# Patient Record
Sex: MALE | HISPANIC OR LATINO | Employment: FULL TIME | URBAN - METROPOLITAN AREA
[De-identification: names, ages, dates, MRNs, and addresses within clinical notes are randomized per-mention and may not be internally consistent; named-entity substitution may affect disease eponyms.]

---

## 2023-03-08 PROBLEM — R03.0 BLOOD PRESSURE ELEVATED WITHOUT HISTORY OF HTN: Status: ACTIVE | Noted: 2023-03-08

## 2023-03-08 PROBLEM — J04.0 ACUTE LARYNGITIS: Status: ACTIVE | Noted: 2023-03-08

## 2023-03-08 PROBLEM — F98.8 ADD (ATTENTION DEFICIT DISORDER): Status: ACTIVE | Noted: 2023-03-08

## 2023-03-08 PROBLEM — R07.9 CHEST PAIN: Status: ACTIVE | Noted: 2023-03-08

## 2023-03-08 PROBLEM — M10.9 ACUTE GOUT: Status: ACTIVE | Noted: 2023-03-08

## 2023-03-08 PROBLEM — K59.00 CONSTIPATION: Status: ACTIVE | Noted: 2023-03-08

## 2023-03-08 PROBLEM — R19.4 CHANGE IN BOWEL HABITS: Status: ACTIVE | Noted: 2023-03-08

## 2023-03-08 PROBLEM — D12.6 COLON ADENOMA: Status: ACTIVE | Noted: 2023-03-08

## 2023-03-08 RX ORDER — SILDENAFIL 100 MG/1
100 TABLET, FILM COATED ORAL
COMMUNITY
Start: 2019-10-17

## 2023-03-08 RX ORDER — DEXTROAMPHETAMINE SACCHARATE, AMPHETAMINE ASPARTATE, DEXTROAMPHETAMINE SULFATE AND AMPHETAMINE SULFATE 5; 5; 5; 5 MG/1; MG/1; MG/1; MG/1
20 TABLET ORAL DAILY
COMMUNITY
Start: 2016-08-05 | End: 2023-03-22 | Stop reason: SDUPTHER

## 2023-03-21 ENCOUNTER — TELEPHONE (OUTPATIENT)
Dept: PRIMARY CARE | Facility: CLINIC | Age: 63
End: 2023-03-21
Payer: COMMERCIAL

## 2023-03-21 DIAGNOSIS — F98.8 ATTENTION DEFICIT DISORDER (ADD) WITHOUT HYPERACTIVITY: Primary | ICD-10-CM

## 2023-03-23 RX ORDER — DEXTROAMPHETAMINE SACCHARATE, AMPHETAMINE ASPARTATE, DEXTROAMPHETAMINE SULFATE AND AMPHETAMINE SULFATE 5; 5; 5; 5 MG/1; MG/1; MG/1; MG/1
20 TABLET ORAL DAILY
Qty: 30 TABLET | Refills: 0 | Status: SHIPPED | OUTPATIENT
Start: 2023-03-23 | End: 2023-03-28 | Stop reason: SDUPTHER

## 2023-03-27 ENCOUNTER — APPOINTMENT (OUTPATIENT)
Dept: PRIMARY CARE | Facility: CLINIC | Age: 63
End: 2023-03-27
Payer: COMMERCIAL

## 2023-03-28 ENCOUNTER — TELEMEDICINE (OUTPATIENT)
Dept: PRIMARY CARE | Facility: CLINIC | Age: 63
End: 2023-03-28
Payer: COMMERCIAL

## 2023-03-28 DIAGNOSIS — F98.8 ATTENTION DEFICIT DISORDER (ADD) WITHOUT HYPERACTIVITY: ICD-10-CM

## 2023-03-28 DIAGNOSIS — Z00.00 WELL ADULT HEALTH CHECK: Primary | ICD-10-CM

## 2023-03-28 DIAGNOSIS — F90.0 ATTENTION DEFICIT HYPERACTIVITY DISORDER (ADHD), PREDOMINANTLY INATTENTIVE TYPE: ICD-10-CM

## 2023-03-28 PROCEDURE — 99213 OFFICE O/P EST LOW 20 MIN: CPT | Performed by: INTERNAL MEDICINE

## 2023-03-28 RX ORDER — DEXTROAMPHETAMINE SACCHARATE, AMPHETAMINE ASPARTATE, DEXTROAMPHETAMINE SULFATE AND AMPHETAMINE SULFATE 5; 5; 5; 5 MG/1; MG/1; MG/1; MG/1
20 TABLET ORAL DAILY
Qty: 30 TABLET | Refills: 0 | Status: SHIPPED | OUTPATIENT
Start: 2023-03-28 | End: 2023-04-21 | Stop reason: SDUPTHER

## 2023-03-28 ASSESSMENT — ENCOUNTER SYMPTOMS: HEADACHES: 1

## 2023-03-28 NOTE — PROGRESS NOTES
Subjective   Patient ID: Rajat Donovan is a 62 y.o. male who presents for Headache.  Patient in for a visit, on medication for attention deficit disorder. Patient denies having any side effects, no problems with appetite, sleep, and it is effective.      Headache         Review of Systems   Neurological:  Positive for headaches.       Objective   Physical Exam  There were no vitals taken for this visit.        Assessment/Plan   Problem List Items Addressed This Visit          Other    ADD (attention deficit disorder)    Well adult health check - Primary    Relevant Orders    Colonoscopy     ATTENTION DEFICIT PLAN: Effective medication with no side effects, blood pressure is normal , sleep, appetite, and weight stable. Medication will be continued, advised to keep prescription in a safe place and used only for the prescribed medical condition. If problems arise patient knows to discontinue Medication and come back sooner.  Urine Drug Screen up to date, Controlled Substance Agreement signed and up to date.  OARRS report reviewed.

## 2023-04-21 DIAGNOSIS — F98.8 ATTENTION DEFICIT DISORDER (ADD) WITHOUT HYPERACTIVITY: ICD-10-CM

## 2023-04-21 RX ORDER — DEXTROAMPHETAMINE SACCHARATE, AMPHETAMINE ASPARTATE, DEXTROAMPHETAMINE SULFATE AND AMPHETAMINE SULFATE 5; 5; 5; 5 MG/1; MG/1; MG/1; MG/1
20 TABLET ORAL DAILY
Qty: 30 TABLET | Refills: 0 | Status: SHIPPED | OUTPATIENT
Start: 2023-04-21 | End: 2023-05-31 | Stop reason: SDUPTHER

## 2023-05-22 DIAGNOSIS — M10.9 GOUT, UNSPECIFIED CAUSE, UNSPECIFIED CHRONICITY, UNSPECIFIED SITE: Primary | ICD-10-CM

## 2023-05-22 RX ORDER — COLCHICINE 0.6 MG/1
0.6 TABLET ORAL 2 TIMES DAILY
Qty: 30 TABLET | Refills: 1 | Status: SHIPPED | OUTPATIENT
Start: 2023-05-22 | End: 2023-06-06

## 2023-05-22 RX ORDER — COLCHICINE 0.6 MG/1
0.6 TABLET ORAL 2 TIMES DAILY
COMMUNITY
End: 2023-05-22 | Stop reason: SDUPTHER

## 2023-05-31 DIAGNOSIS — F98.8 ATTENTION DEFICIT DISORDER (ADD) WITHOUT HYPERACTIVITY: ICD-10-CM

## 2023-05-31 NOTE — TELEPHONE ENCOUNTER
Patient has not had in office appointment for a while he is requesting a  refill on his Adderall? Please advise

## 2023-06-01 RX ORDER — DEXTROAMPHETAMINE SACCHARATE, AMPHETAMINE ASPARTATE, DEXTROAMPHETAMINE SULFATE AND AMPHETAMINE SULFATE 5; 5; 5; 5 MG/1; MG/1; MG/1; MG/1
20 TABLET ORAL DAILY
Qty: 30 TABLET | Refills: 0 | Status: SHIPPED | OUTPATIENT
Start: 2023-06-01 | End: 2023-08-21 | Stop reason: SDUPTHER

## 2023-06-04 DIAGNOSIS — M10.9 GOUT, UNSPECIFIED CAUSE, UNSPECIFIED CHRONICITY, UNSPECIFIED SITE: ICD-10-CM

## 2023-06-06 RX ORDER — COLCHICINE 0.6 MG/1
0.6 TABLET ORAL 2 TIMES DAILY
Qty: 180 TABLET | Refills: 1 | Status: SHIPPED | OUTPATIENT
Start: 2023-06-06

## 2023-08-17 ENCOUNTER — TELEPHONE (OUTPATIENT)
Dept: PRIMARY CARE | Facility: CLINIC | Age: 63
End: 2023-08-17
Payer: COMMERCIAL

## 2023-08-18 NOTE — TELEPHONE ENCOUNTER
LVM for patient to CB and schedule an appt. In order to get refill sent it has been over 6 months. UDS isnt due until 12-

## 2023-08-21 ENCOUNTER — OFFICE VISIT (OUTPATIENT)
Dept: PRIMARY CARE | Facility: CLINIC | Age: 63
End: 2023-08-21
Payer: COMMERCIAL

## 2023-08-21 VITALS
HEIGHT: 73 IN | RESPIRATION RATE: 17 BRPM | WEIGHT: 214.6 LBS | OXYGEN SATURATION: 96 % | HEART RATE: 82 BPM | BODY MASS INDEX: 28.44 KG/M2 | SYSTOLIC BLOOD PRESSURE: 122 MMHG | DIASTOLIC BLOOD PRESSURE: 74 MMHG

## 2023-08-21 DIAGNOSIS — F98.8 ATTENTION DEFICIT DISORDER (ADD) WITHOUT HYPERACTIVITY: ICD-10-CM

## 2023-08-21 PROCEDURE — 99213 OFFICE O/P EST LOW 20 MIN: CPT | Performed by: INTERNAL MEDICINE

## 2023-08-21 PROCEDURE — 1036F TOBACCO NON-USER: CPT | Performed by: INTERNAL MEDICINE

## 2023-08-21 RX ORDER — DEXTROAMPHETAMINE SACCHARATE, AMPHETAMINE ASPARTATE, DEXTROAMPHETAMINE SULFATE AND AMPHETAMINE SULFATE 5; 5; 5; 5 MG/1; MG/1; MG/1; MG/1
20 TABLET ORAL DAILY
Qty: 30 TABLET | Refills: 0 | Status: SHIPPED | OUTPATIENT
Start: 2023-08-21 | End: 2023-10-03 | Stop reason: SDUPTHER

## 2023-08-21 ASSESSMENT — PAIN SCALES - GENERAL: PAINLEVEL: 0-NO PAIN

## 2023-08-21 NOTE — PROGRESS NOTES
"Subjective   Patient ID: Rajat Donovan is a 62 y.o. male who presents for Med Refill.  In for ADD refill. Doing well and health maint Up to date.      .  Review of Systems   All other systems reviewed and are negative.      Objective   Physical Exam  Constitutional:       Appearance: Normal appearance.   HENT:      Head: Normocephalic and atraumatic.      Nose: Nose normal.   Cardiovascular:      Rate and Rhythm: Normal rate and regular rhythm.   Abdominal:      General: Abdomen is flat.      Palpations: Abdomen is soft.   Musculoskeletal:         General: Normal range of motion.      Cervical back: Normal range of motion.   Skin:     General: Skin is warm and dry.   Neurological:      Mental Status: He is alert.       /74 (BP Location: Left arm)   Pulse 82   Resp 17   Ht 1.854 m (6' 1\")   Wt 97.3 kg (214 lb 9.6 oz)   SpO2 96%   BMI 28.31 kg/m²         Assessment/Plan   Problem List Items Addressed This Visit       ADD (attention deficit disorder)     .jpadd     "

## 2023-08-21 NOTE — PROGRESS NOTES
Patient is here for a 6 month f/up for controlled medication   UDS Due - 12/13/2023  No pain   Needs 1 Rx refill

## 2023-08-22 ENCOUNTER — APPOINTMENT (OUTPATIENT)
Dept: PRIMARY CARE | Facility: CLINIC | Age: 63
End: 2023-08-22
Payer: COMMERCIAL

## 2023-10-03 DIAGNOSIS — F98.8 ATTENTION DEFICIT DISORDER (ADD) WITHOUT HYPERACTIVITY: ICD-10-CM

## 2023-10-05 RX ORDER — DEXTROAMPHETAMINE SACCHARATE, AMPHETAMINE ASPARTATE, DEXTROAMPHETAMINE SULFATE AND AMPHETAMINE SULFATE 5; 5; 5; 5 MG/1; MG/1; MG/1; MG/1
20 TABLET ORAL DAILY
Qty: 30 TABLET | Refills: 0 | Status: SHIPPED | OUTPATIENT
Start: 2023-10-05 | End: 2024-03-18 | Stop reason: SDUPTHER

## 2023-10-13 ENCOUNTER — OFFICE VISIT (OUTPATIENT)
Dept: GASTROENTEROLOGY | Facility: EXTERNAL LOCATION | Age: 63
End: 2023-10-13
Payer: COMMERCIAL

## 2023-10-13 DIAGNOSIS — K63.89 OTHER SPECIFIED DISEASES OF INTESTINE: ICD-10-CM

## 2023-10-13 DIAGNOSIS — Z12.11 SCREEN FOR COLON CANCER: Primary | ICD-10-CM

## 2023-10-13 DIAGNOSIS — Z86.010 PERSONAL HISTORY OF COLONIC POLYPS: ICD-10-CM

## 2023-10-13 DIAGNOSIS — K57.30 DIVERTICULOSIS OF LARGE INTESTINE WITHOUT DIVERTICULITIS: ICD-10-CM

## 2023-10-13 DIAGNOSIS — K64.8 INTERNAL HEMORRHOIDS: ICD-10-CM

## 2023-10-13 PROCEDURE — 45378 DIAGNOSTIC COLONOSCOPY: CPT | Performed by: INTERNAL MEDICINE

## 2023-10-13 PROCEDURE — 1036F TOBACCO NON-USER: CPT | Performed by: INTERNAL MEDICINE

## 2024-03-18 DIAGNOSIS — F98.8 ATTENTION DEFICIT DISORDER (ADD) WITHOUT HYPERACTIVITY: ICD-10-CM

## 2024-03-18 RX ORDER — DEXTROAMPHETAMINE SACCHARATE, AMPHETAMINE ASPARTATE, DEXTROAMPHETAMINE SULFATE AND AMPHETAMINE SULFATE 5; 5; 5; 5 MG/1; MG/1; MG/1; MG/1
20 TABLET ORAL DAILY
Qty: 30 TABLET | Refills: 0 | Status: SHIPPED | OUTPATIENT
Start: 2024-03-18 | End: 2024-03-20 | Stop reason: SDUPTHER

## 2024-03-20 DIAGNOSIS — F98.8 ATTENTION DEFICIT DISORDER (ADD) WITHOUT HYPERACTIVITY: ICD-10-CM

## 2024-03-20 RX ORDER — DEXTROAMPHETAMINE SACCHARATE, AMPHETAMINE ASPARTATE, DEXTROAMPHETAMINE SULFATE AND AMPHETAMINE SULFATE 5; 5; 5; 5 MG/1; MG/1; MG/1; MG/1
20 TABLET ORAL DAILY
Qty: 30 TABLET | Refills: 0 | Status: SHIPPED | OUTPATIENT
Start: 2024-03-20

## 2024-03-20 NOTE — TELEPHONE ENCOUNTER
We received a fax from [facility] stating they no longer accepts the patients insurance, I contacted the patient and updated his preferred pharmacy for Rx refill

## 2024-07-18 DIAGNOSIS — Z00.00 LABORATORY EXAMINATION ORDERED AS PART OF A COMPLETE PHYSICAL EXAMINATION: ICD-10-CM

## 2024-07-29 ENCOUNTER — APPOINTMENT (OUTPATIENT)
Dept: PRIMARY CARE | Facility: CLINIC | Age: 64
End: 2024-07-29
Payer: COMMERCIAL

## 2024-07-31 ENCOUNTER — LAB (OUTPATIENT)
Dept: LAB | Facility: LAB | Age: 64
End: 2024-07-31
Payer: COMMERCIAL

## 2024-07-31 DIAGNOSIS — Z00.00 LABORATORY EXAMINATION ORDERED AS PART OF A COMPLETE PHYSICAL EXAMINATION: ICD-10-CM

## 2024-07-31 LAB
25(OH)D3 SERPL-MCNC: 44 NG/ML (ref 30–100)
ALT SERPL W P-5'-P-CCNC: 30 U/L (ref 10–52)
ANION GAP SERPL CALC-SCNC: 15 MMOL/L (ref 10–20)
APPEARANCE UR: CLEAR
BILIRUB UR STRIP.AUTO-MCNC: NEGATIVE MG/DL
BUN SERPL-MCNC: 22 MG/DL (ref 6–23)
CALCIUM SERPL-MCNC: 9.9 MG/DL (ref 8.6–10.6)
CHLORIDE SERPL-SCNC: 102 MMOL/L (ref 98–107)
CHOLEST SERPL-MCNC: 242 MG/DL (ref 0–199)
CHOLESTEROL/HDL RATIO: 5.1
CO2 SERPL-SCNC: 28 MMOL/L (ref 21–32)
COLOR UR: YELLOW
CREAT SERPL-MCNC: 0.98 MG/DL (ref 0.5–1.3)
EGFRCR SERPLBLD CKD-EPI 2021: 87 ML/MIN/1.73M*2
ERYTHROCYTE [DISTWIDTH] IN BLOOD BY AUTOMATED COUNT: 13 % (ref 11.5–14.5)
EST. AVERAGE GLUCOSE BLD GHB EST-MCNC: 114 MG/DL
GLUCOSE SERPL-MCNC: 91 MG/DL (ref 74–99)
GLUCOSE UR STRIP.AUTO-MCNC: NORMAL MG/DL
HBA1C MFR BLD: 5.6 %
HCT VFR BLD AUTO: 49.1 % (ref 41–52)
HDLC SERPL-MCNC: 47.3 MG/DL
HGB BLD-MCNC: 15.6 G/DL (ref 13.5–17.5)
KETONES UR STRIP.AUTO-MCNC: NEGATIVE MG/DL
LDLC SERPL CALC-MCNC: 129 MG/DL
LEUKOCYTE ESTERASE UR QL STRIP.AUTO: NEGATIVE
MCH RBC QN AUTO: 28 PG (ref 26–34)
MCHC RBC AUTO-ENTMCNC: 31.8 G/DL (ref 32–36)
MCV RBC AUTO: 88 FL (ref 80–100)
NITRITE UR QL STRIP.AUTO: NEGATIVE
NON HDL CHOLESTEROL: 195 MG/DL (ref 0–149)
NRBC BLD-RTO: 0 /100 WBCS (ref 0–0)
PH UR STRIP.AUTO: 5.5 [PH]
PLATELET # BLD AUTO: 268 X10*3/UL (ref 150–450)
POTASSIUM SERPL-SCNC: 4.8 MMOL/L (ref 3.5–5.3)
PROT UR STRIP.AUTO-MCNC: NEGATIVE MG/DL
PSA SERPL-MCNC: 5.02 NG/ML
RBC # BLD AUTO: 5.58 X10*6/UL (ref 4.5–5.9)
RBC # UR STRIP.AUTO: NEGATIVE /UL
SODIUM SERPL-SCNC: 140 MMOL/L (ref 136–145)
SP GR UR STRIP.AUTO: 1.03
TRIGL SERPL-MCNC: 328 MG/DL (ref 0–149)
TSH SERPL-ACNC: 2.56 MIU/L (ref 0.44–3.98)
UROBILINOGEN UR STRIP.AUTO-MCNC: NORMAL MG/DL
VIT B12 SERPL-MCNC: 525 PG/ML (ref 211–911)
VLDL: 66 MG/DL (ref 0–40)
WBC # BLD AUTO: 7.5 X10*3/UL (ref 4.4–11.3)

## 2024-07-31 PROCEDURE — 82306 VITAMIN D 25 HYDROXY: CPT

## 2024-07-31 PROCEDURE — 84153 ASSAY OF PSA TOTAL: CPT

## 2024-07-31 PROCEDURE — 84460 ALANINE AMINO (ALT) (SGPT): CPT

## 2024-07-31 PROCEDURE — 81003 URINALYSIS AUTO W/O SCOPE: CPT

## 2024-07-31 PROCEDURE — 83036 HEMOGLOBIN GLYCOSYLATED A1C: CPT

## 2024-07-31 PROCEDURE — 85027 COMPLETE CBC AUTOMATED: CPT

## 2024-07-31 PROCEDURE — 80048 BASIC METABOLIC PNL TOTAL CA: CPT

## 2024-07-31 PROCEDURE — 80061 LIPID PANEL: CPT

## 2024-07-31 PROCEDURE — 84443 ASSAY THYROID STIM HORMONE: CPT

## 2024-07-31 PROCEDURE — 36415 COLL VENOUS BLD VENIPUNCTURE: CPT

## 2024-07-31 PROCEDURE — 82607 VITAMIN B-12: CPT

## 2024-08-05 ENCOUNTER — APPOINTMENT (OUTPATIENT)
Dept: PRIMARY CARE | Facility: CLINIC | Age: 64
End: 2024-08-05
Payer: COMMERCIAL

## 2024-08-09 ENCOUNTER — APPOINTMENT (OUTPATIENT)
Dept: PRIMARY CARE | Facility: CLINIC | Age: 64
End: 2024-08-09
Payer: COMMERCIAL

## 2024-08-09 VITALS
WEIGHT: 218 LBS | OXYGEN SATURATION: 98 % | TEMPERATURE: 97.8 F | HEART RATE: 92 BPM | RESPIRATION RATE: 18 BRPM | DIASTOLIC BLOOD PRESSURE: 100 MMHG | BODY MASS INDEX: 28.89 KG/M2 | HEIGHT: 73 IN | SYSTOLIC BLOOD PRESSURE: 170 MMHG

## 2024-08-09 DIAGNOSIS — F98.8 ATTENTION DEFICIT DISORDER (ADD) WITHOUT HYPERACTIVITY: ICD-10-CM

## 2024-08-09 DIAGNOSIS — E78.00 ELEVATED LDL CHOLESTEROL LEVEL: Primary | ICD-10-CM

## 2024-08-09 LAB
AMPHETAMINES UR QL SCN: NORMAL
BARBITURATES UR QL SCN: NORMAL
BZE UR QL SCN: NORMAL
CANNABINOIDS UR QL SCN: NORMAL
CREAT UR-MCNC: 47.3 MG/DL (ref 20–370)
PCP UR QL SCN: NORMAL

## 2024-08-09 PROCEDURE — 80358 DRUG SCREENING METHADONE: CPT

## 2024-08-09 PROCEDURE — 80346 BENZODIAZEPINES1-12: CPT

## 2024-08-09 PROCEDURE — 80361 OPIATES 1 OR MORE: CPT

## 2024-08-09 PROCEDURE — 82570 ASSAY OF URINE CREATININE: CPT

## 2024-08-09 PROCEDURE — 80307 DRUG TEST PRSMV CHEM ANLYZR: CPT

## 2024-08-09 PROCEDURE — 80373 DRUG SCREENING TRAMADOL: CPT

## 2024-08-09 PROCEDURE — 99396 PREV VISIT EST AGE 40-64: CPT | Performed by: INTERNAL MEDICINE

## 2024-08-09 PROCEDURE — 1036F TOBACCO NON-USER: CPT | Performed by: INTERNAL MEDICINE

## 2024-08-09 PROCEDURE — 80354 DRUG SCREENING FENTANYL: CPT

## 2024-08-09 PROCEDURE — 3008F BODY MASS INDEX DOCD: CPT | Performed by: INTERNAL MEDICINE

## 2024-08-09 PROCEDURE — 80365 DRUG SCREENING OXYCODONE: CPT

## 2024-08-09 PROCEDURE — 80368 SEDATIVE HYPNOTICS: CPT

## 2024-08-09 RX ORDER — DEXTROAMPHETAMINE SACCHARATE, AMPHETAMINE ASPARTATE, DEXTROAMPHETAMINE SULFATE AND AMPHETAMINE SULFATE 5; 5; 5; 5 MG/1; MG/1; MG/1; MG/1
20 TABLET ORAL DAILY
Qty: 30 TABLET | Refills: 0 | Status: SHIPPED | OUTPATIENT
Start: 2024-08-09

## 2024-08-09 NOTE — PROGRESS NOTES
"Subjective   Patient ID: Rajat Donovan is a 63 y.o. male who presents for Annual Exam (Hard stool,full body scan).  Patient comes in for a physical examination, doing well over - all with no particular complaints.   Also in for laboratory review and health maintenance update.  Updating family history as well.          Review of Systems   All other systems reviewed and are negative.      Objective   Physical Exam  Constitutional:       Appearance: Normal appearance.   HENT:      Head: Normocephalic and atraumatic.      Nose: Nose normal.   Cardiovascular:      Rate and Rhythm: Normal rate and regular rhythm.   Abdominal:      General: Abdomen is flat.      Palpations: Abdomen is soft.   Musculoskeletal:         General: Normal range of motion.      Cervical back: Normal range of motion.   Skin:     General: Skin is warm and dry.   Neurological:      Mental Status: He is alert.       BP (!) 170/100   Pulse 92   Temp 36.6 °C (97.8 °F)   Resp 18   Ht 1.854 m (6' 1\")   Wt 98.9 kg (218 lb)   SpO2 98%   BMI 28.76 kg/m²         Assessment/Plan   Problem List Items Addressed This Visit       ADD (attention deficit disorder)    Relevant Medications    amphetamine-dextroamphetamine (Adderall) 20 mg tablet    Other Relevant Orders    Opiate/Opioid/Benzo Prescription Compliance    Elevated LDL cholesterol level - Primary    Relevant Orders    MR prostate with idalia boundaries if pirads 3 or above     ASSESSMENT AND PLAN: Patient on examination is in fair health except for medical conditions discussed above, obtained screening blood tests to screen for high cholesterol, diabetes, thyroid, Ekg if above 50 years old or earlier if with cardiac history. Patient should be taking enough calcium in a balanced diet  Weight control especially if BMI is above ideal range. For Female Patients Only:  Mammogram yearly and PAP test with gynecology unless s/p Total hysterectomy  Bone density test at age 60 and above and every two years " after that. Preventive Medicine: colon cancer screening by age 45 if no family history as well PSA @ 50 , balanced diet, and exercise as discussed. Seat belt use for injury prevention, living will. Substance use and /or tobacco use counseled when applicable. Alcohol use discussed, use designated . Immunizations TD age 50 and every 10 years. Pneumovax and shingles vaccine counseled. Yearly flu vaccine unless contraindicated. More than 50% of office visit time spent counseling the patient, questions were answered. If problems arise, patient is to call or come back in. It is understood that the responsibility of healthcare is shared by the patient by following a healthy lifestyle and following the plan above as discussed. Advised complete physical examination every year. Pending additional results, may need to come for a return office visit for follow-up.

## 2024-09-04 ENCOUNTER — HOSPITAL ENCOUNTER (OUTPATIENT)
Dept: RADIOLOGY | Facility: HOSPITAL | Age: 64
Discharge: HOME | End: 2024-09-04
Payer: COMMERCIAL

## 2024-09-04 VITALS — WEIGHT: 220 LBS | BODY MASS INDEX: 29.03 KG/M2

## 2024-09-04 DIAGNOSIS — E78.00 ELEVATED LDL CHOLESTEROL LEVEL: ICD-10-CM

## 2024-09-04 PROCEDURE — 72197 MRI PELVIS W/O & W/DYE: CPT

## 2024-09-04 PROCEDURE — A9575 INJ GADOTERATE MEGLUMI 0.1ML: HCPCS | Performed by: INTERNAL MEDICINE

## 2024-09-04 PROCEDURE — 76498 UNLISTED MR PROCEDURE: CPT | Performed by: RADIOLOGY

## 2024-09-04 PROCEDURE — 72197 MRI PELVIS W/O & W/DYE: CPT | Performed by: RADIOLOGY

## 2024-09-04 PROCEDURE — 2550000001 HC RX 255 CONTRASTS: Performed by: INTERNAL MEDICINE

## 2024-09-04 RX ORDER — GADOTERATE MEGLUMINE 376.9 MG/ML
19 INJECTION INTRAVENOUS
Status: COMPLETED | OUTPATIENT
Start: 2024-09-04 | End: 2024-09-04

## 2024-09-09 ENCOUNTER — TELEPHONE (OUTPATIENT)
Dept: PRIMARY CARE | Facility: CLINIC | Age: 64
End: 2024-09-09
Payer: COMMERCIAL

## 2024-09-09 DIAGNOSIS — N40.2 PROSTATE NODULE: ICD-10-CM

## 2024-09-09 DIAGNOSIS — R97.20 ELEVATED PSA: ICD-10-CM

## 2024-09-09 NOTE — TELEPHONE ENCOUNTER
Pt said JOSE MARIA called him from [facility] yesterday and wants him to have help getting sched w Urologist.  Pt does not remember the name of the urologist.  Tresa GROSS.

## 2024-09-12 ENCOUNTER — OFFICE VISIT (OUTPATIENT)
Dept: UROLOGY | Facility: CLINIC | Age: 64
End: 2024-09-12
Payer: COMMERCIAL

## 2024-09-12 DIAGNOSIS — R97.20 ELEVATED PSA: Primary | ICD-10-CM

## 2024-09-12 LAB
POC APPEARANCE, URINE: CLEAR
POC BILIRUBIN, URINE: NEGATIVE
POC BLOOD, URINE: NEGATIVE
POC COLOR, URINE: YELLOW
POC GLUCOSE, URINE: NEGATIVE MG/DL
POC KETONES, URINE: NEGATIVE MG/DL
POC LEUKOCYTES, URINE: NEGATIVE
POC NITRITE,URINE: NEGATIVE
POC PH, URINE: 7.5 PH
POC PROTEIN, URINE: NEGATIVE MG/DL
POC SPECIFIC GRAVITY, URINE: 1.02
POC UROBILINOGEN, URINE: 0.2 EU/DL

## 2024-09-12 PROCEDURE — 99203 OFFICE O/P NEW LOW 30 MIN: CPT | Performed by: NURSE PRACTITIONER

## 2024-09-12 PROCEDURE — 81003 URINALYSIS AUTO W/O SCOPE: CPT | Performed by: NURSE PRACTITIONER

## 2024-09-12 NOTE — PROGRESS NOTES
[facility]  Outpatient Clinic Note    Subjective   Rajat Donovan is a 63 y.o. male    History of Present Illness   Patient presenting to clinic today kindly referred by PCP Dr. Sims. NPV for evaluation of elevated PSA. History of HDL, Constipation, ADD, and ED taking Sildenafil 100 mg.   Family history Prostate Cancer: Uncle, . Unsure if he passed due to Prostate Cancer.   Patient denies bothersome urinary sympotms. No gross hematuria, dysuria, frequency, fevers, n/v, or flank pain.   NTF 0 to 1x depending on fluid intake. Drinks at least a gallon of water daily. Drinks green tea, and mostly   Plant based diet. Patient is a nonsmoker     Urinalysis today is Negative   PVR: Patient Declines     I personally reviewed Prostate MRI from 2024   IMPRESSION:  A PI-RADS 4 lesion in the right posteromedial peripheral zone of the  midgland. No definite signs of gross extracapsular extension. No  evidence of enlarged pelvic lymph nodes.    Lab Results   Component Value Date    PSA 5.02 (H) 2024    PSA 3.45 2022     Past Medical History and Surgical History   No past medical history on file.  No past surgical history on file.    Medications  Current Outpatient Medications on File Prior to Visit   Medication Sig Dispense Refill    amphetamine-dextroamphetamine (Adderall) 20 mg tablet Take 1 tablet (20 mg) by mouth once daily. 30 tablet 0    colchicine 0.6 mg tablet TAKE 1 TABLET (0.6 MG) BY MOUTH 2 TIMES A DAY. 180 tablet 1    sildenafil (Viagra) 100 mg tablet Take 1 tablet (100 mg) by mouth. Take 6 tablet other       No current facility-administered medications on file prior to visit.       Objective   Physicial Exam  General: Well developed, well nourished, alert and cooperative, appears in no acute distress  Eyes: Non-injected conjunctiva, sclera clear, no proptosis  Cardiac: Extremities are warm and well perfused. No edema, cyanosis or pallor.   Lungs: Breathing is easy, non-labored.  Speaking in clear and complete sentences. Normal diaphragmatic movement.  MSK: Ambulatory with steady gait, unassisted  Neuro: alert and oriented to person, place and time  Psych: Demonstrates good judgement and reason, without hallucinations, abnormal affect or abnormal behaviors.  Skin: no obvious lesions, no rashes.    No visits with results within 1 Day(s) from this visit.   Latest known visit with results is:   Office Visit on 08/09/2024   Component Date Value Ref Range Status    Creatinine, Urine Random 08/09/2024 47.3  20.0 - 370.0 mg/dL Final    A urine creatinine result >= 20 mg/dL is considered valid without suspicion of dilution.  Samples with results below this range will automatically reflex to specific  gravity testing to verify specimen integrity.    Amphetamine Screen, Urine 08/09/2024 Presumptive Negative  Presumptive Negative Final    CUTOFF LEVEL: 500 NG/ML   Cross-reactivity has been reported with high concentrations   of the following drugs: buproprion, chloroquine, chlorpromazine,   ephedrine, mephentermine, fenfluramine, phentermine,   phenylpropanolamine, pseudoephedrine, and propranolol.    Barbiturate Screen, Urine 08/09/2024 Presumptive Negative  Presumptive Negative Final    CUTOFF LEVEL: 200 NG/ML    Cannabinoid Screen, Urine 08/09/2024 Presumptive Negative  Presumptive Negative Final    CUTOFF LEVEL: 50 NG/ML    Cocaine Metabolite Screen, Urine 08/09/2024 Presumptive Negative  Presumptive Negative Final    CUTOFF LEVEL: 150 NG/ML    PCP Screen, Urine 08/09/2024 Presumptive Negative  Presumptive Negative Final    CUTOFF LEVEL:  25 NG/ML  Cross-reactivity has been reported with dextromethorphan.    Clonazepam 08/09/2024 <25  <25 ng/mL Final    7-Aminoclonazepam 08/09/2024 <25  <25 ng/mL Final    Alprazolam 08/09/2024 <25  <25 ng/mL Final    Alpha-Hydroxyalprazolam 08/09/2024 <25  <25 ng/mL Final    Midazolam 08/09/2024 <25  <25 ng/mL Final    Alpha-Hydroxymidazolam 08/09/2024 <25  <25  ng/mL Final    Chlordiazepoxide 08/09/2024 <25  <25 ng/mL Final    Diazepam 08/09/2024 <25  <25 ng/mL Final    Nordiazepam 08/09/2024 <25  <25 ng/mL Final    Temazepam 08/09/2024 <25  <25 ng/mL Final    Oxazepam 08/09/2024 <25  <25 ng/mL Final    Lorazepam 08/09/2024 <25  <25 ng/mL Final    Methadone 08/09/2024 <25  <25 ng/mL Final    EDDP 08/09/2024 <25  <25 ng/mL Final    6-Acetylmorphine 08/09/2024 <25  <25 ng/mL Final    Codeine 08/09/2024 <50  <50 ng/mL Final    Hydrocodone 08/09/2024 <25  <25 ng/mL Final    Hydromorphone 08/09/2024 <25  <25 ng/mL Final    Morphine  08/09/2024 <50  <50 ng/mL Final    Norhydrocodone 08/09/2024 <25  <25 ng/mL Final    Noroxycodone 08/09/2024 <25  <25 ng/mL Final    Oxycodone 08/09/2024 <25  <25 ng/mL Final    Oxymorphone 08/09/2024 <25  <25 ng/mL Final    Fentanyl 08/09/2024 <2.5  <2.5 ng/mL Final    Norfentanyl 08/09/2024 <2.5  <2.5 ng/mL Final    Tramadol 08/09/2024 <50  <50 ng/mL Final    O-Desmethyltramadol 08/09/2024 <50  <50 ng/mL Final    Zolpidem 08/09/2024 <25  <25 ng/mL Final    Zolpidem Metabolite (ZCA) 08/09/2024 <25  <25 ng/mL Final      Review of Systems  All other systems have been reviewed and are negative for complaint.      Assessment and Plan     - Elevated PSA   Today we discussed PSA as a tool to screen gentleman for prostate cancer. We discussed that many factors can elevate the PSA, and when the PSA is elevated further testing is warranted. I explained that while PSA is used for  prostate cancer screening, it is not specific to prostate cancer and can be elevated with benign growth of the prostate. We discussed options of proceeding with transrectal ultrasound prostate biopsy, or continuing to monitor his PSA.     Patient considering Prostate Biopsy. Has virtual appt. With Dr. Yao tomorrow    All questions and concerns were addressed. Patient verbalizes understanding and has no other questions at this time. You are able to have email access to your  chart. You can sign into MyCare or add the Follow My Health anila on your smart phone to review today's visit, laboratory work and imaging.   If you have any questions about your care, do not hesitate to call and leave a message, we return calls in a timely manner.    Rose Marie-- NATASHA LANGE  Office Phone:  [phone]

## 2024-09-12 NOTE — PROGRESS NOTES
"Virtual or Telephone Consent    An interactive audio and video telecommunication system which permits real time communications between the patient (at the originating site) and provider (at the distant site) was utilized to provide this telehealth service.   Verbal consent was requested and obtained from Rajat Donovan on this date, 24 for a telehealth visit.     FUV    Last visit - with Feli     HISTORY OF PRESENT ILLNESS:   Rajat Donovan is a 63 y.o. male who is being seen today for elevated PSA with PI-RADS 4 on MRI  -History of HDL, Constipation, ADD  -ED taking Sildenafil 100 mg.   -Family history Prostate Cancer: Uncle, . Unsure if he passed due to Prostate Cancer.   PAST MEDICAL HISTORY:  No past medical history on file.    PAST SURGICAL HISTORY:  No past surgical history on file.     ALLERGIES:   No Known Allergies     MEDICATIONS:   Current Outpatient Medications   Medication Instructions    amphetamine-dextroamphetamine (Adderall) 20 mg tablet 20 mg, oral, Daily    colchicine 0.6 mg, oral, 2 times daily    sildenafil (VIAGRA) 100 mg, oral, Take 6 tablet other        PHYSICAL EXAM:  There were no vitals taken for this visit.  Constitutional: Patient appears well-developed and well-nourished. No distress.    Pulmonary/Chest: Effort normal. No respiratory distress.   Abdominal: Soft, ND NT  : WNL  Musculoskeletal: Normal range of motion.    Neurological: Alert and oriented to person, place, and time.  Psychiatric: Normal mood and affect. Behavior is normal. Thought content normal.      Labs:  No results found for: \"TESTOSTERONE\"  Lab Results   Component Value Date    PSA 5.02 (H) 2024     PSA 3.45 22          No components found for: \"CBC\"  Lab Results   Component Value Date    CREATININE 0.98 2024     No components found for: \"TESTOTMS\"  No results found for: \"TESTF\"    Imaging: Prostate MRI from 2024   IMPRESSION:  A PI-RADS 4 lesion in the right posteromedial " peripheral zone of the midgland. No definite signs of gross extracapsular extension. No evidence of enlarged pelvic lymph nodes.    Discussion: Results reviewed with patient. Patient reassured.   No urinary complaints at this time. Denies hematuria, dysuria, nocturia, flank pain, and bothersome frequency or urgency. Denies pushing or straining to void and states he feels he empties his bladder when voiding.Patient mentions he drinks a gallon of water daily. Denies infections, no prior kidney or prostate disease. With his PSA and MRI findings,recommend proceeding with a prostate biopsy, explained. Risks, benefits, alternatives discussed. Patient's concerns addressed. Patient agrees to proceed with a biopsy and will be scheduled in a timely manner. Patient would like to be scheduled after November 16th because his daughter is getting . Denies bloodthinners. Will proceed with biopsy in-office.        Assessment:      1. Elevated PSA  Biopsy prostate             Plan:   Schedule prostate biopsy and a follow up 2 weeks after biopsy to discuss results.   All questions and concerns were addressed. Patient verbalizes understanding and has no other questions at this time.     Scribe Attestation  By signing my name below, IRadhika Scribe   attest that this documentation has been prepared under the direction and in the presence of Juan Yao MD.

## 2024-09-13 ENCOUNTER — TELEMEDICINE (OUTPATIENT)
Dept: UROLOGY | Facility: HOSPITAL | Age: 64
End: 2024-09-13
Payer: COMMERCIAL

## 2024-09-13 DIAGNOSIS — R97.20 ELEVATED PSA: ICD-10-CM

## 2024-09-13 PROCEDURE — G2211 COMPLEX E/M VISIT ADD ON: HCPCS | Performed by: UROLOGY

## 2024-09-13 PROCEDURE — 99214 OFFICE O/P EST MOD 30 MIN: CPT | Performed by: UROLOGY

## 2024-10-01 DIAGNOSIS — F98.8 ATTENTION DEFICIT DISORDER (ADD) WITHOUT HYPERACTIVITY: ICD-10-CM

## 2024-10-02 RX ORDER — DEXTROAMPHETAMINE SACCHARATE, AMPHETAMINE ASPARTATE, DEXTROAMPHETAMINE SULFATE AND AMPHETAMINE SULFATE 5; 5; 5; 5 MG/1; MG/1; MG/1; MG/1
20 TABLET ORAL DAILY
Qty: 30 TABLET | Refills: 0 | Status: SHIPPED | OUTPATIENT
Start: 2024-10-02

## 2024-11-06 DIAGNOSIS — F98.8 ATTENTION DEFICIT DISORDER (ADD) WITHOUT HYPERACTIVITY: ICD-10-CM

## 2024-11-06 RX ORDER — DEXTROAMPHETAMINE SACCHARATE, AMPHETAMINE ASPARTATE, DEXTROAMPHETAMINE SULFATE AND AMPHETAMINE SULFATE 5; 5; 5; 5 MG/1; MG/1; MG/1; MG/1
20 TABLET ORAL DAILY
Qty: 30 TABLET | Refills: 0 | Status: SHIPPED | OUTPATIENT
Start: 2024-11-06

## 2024-11-11 NOTE — TELEPHONE ENCOUNTER
Adderall sent to wrong pharm.   Pt req [facility]:   [id number] - [facility] [place], [place] - [address] [place] [address] RD

## 2024-11-13 DIAGNOSIS — F98.8 ATTENTION DEFICIT DISORDER (ADD) WITHOUT HYPERACTIVITY: ICD-10-CM

## 2024-11-14 RX ORDER — DEXTROAMPHETAMINE SACCHARATE, AMPHETAMINE ASPARTATE, DEXTROAMPHETAMINE SULFATE AND AMPHETAMINE SULFATE 5; 5; 5; 5 MG/1; MG/1; MG/1; MG/1
20 TABLET ORAL DAILY
Qty: 30 TABLET | Refills: 0 | Status: SHIPPED | OUTPATIENT
Start: 2024-11-14 | End: 2024-11-15 | Stop reason: SDUPTHER

## 2024-11-15 DIAGNOSIS — F98.8 ATTENTION DEFICIT DISORDER (ADD) WITHOUT HYPERACTIVITY: ICD-10-CM

## 2024-11-15 RX ORDER — DEXTROAMPHETAMINE SACCHARATE, AMPHETAMINE ASPARTATE, DEXTROAMPHETAMINE SULFATE AND AMPHETAMINE SULFATE 5; 5; 5; 5 MG/1; MG/1; MG/1; MG/1
20 TABLET ORAL DAILY
Qty: 30 TABLET | Refills: 0 | Status: SHIPPED | OUTPATIENT
Start: 2024-11-15

## 2024-11-22 ENCOUNTER — PROCEDURE VISIT (OUTPATIENT)
Dept: UROLOGY | Facility: HOSPITAL | Age: 64
End: 2024-11-22
Payer: COMMERCIAL

## 2024-11-22 VITALS — DIASTOLIC BLOOD PRESSURE: 92 MMHG | TEMPERATURE: 98.6 F | SYSTOLIC BLOOD PRESSURE: 162 MMHG

## 2024-11-22 DIAGNOSIS — R97.20 ELEVATED PSA: ICD-10-CM

## 2024-11-22 DIAGNOSIS — Z79.2 PROPHYLACTIC ANTIBIOTIC: ICD-10-CM

## 2024-11-22 PROCEDURE — 55700 PR PROSTATE NEEDLE BIOPSY ANY APPROACH: CPT | Performed by: UROLOGY

## 2024-11-22 PROCEDURE — 76872 US TRANSRECTAL: CPT | Performed by: UROLOGY

## 2024-11-22 PROCEDURE — 76942 ECHO GUIDE FOR BIOPSY: CPT | Performed by: UROLOGY

## 2024-11-22 PROCEDURE — 2500000004 HC RX 250 GENERAL PHARMACY W/ HCPCS (ALT 636 FOR OP/ED): Performed by: UROLOGY

## 2024-11-22 PROCEDURE — 55700 HC BIOPSY OF PROSTATE,NEEDLE/PUNCH: CPT | Performed by: UROLOGY

## 2024-11-22 PROCEDURE — 76942 ECHO GUIDE FOR BIOPSY: CPT | Mod: 59 | Performed by: UROLOGY

## 2024-11-22 PROCEDURE — 96372 THER/PROPH/DIAG INJ SC/IM: CPT | Mod: 59 | Performed by: UROLOGY

## 2024-11-22 RX ORDER — CEFTRIAXONE 1 G/1
1 INJECTION, POWDER, FOR SOLUTION INTRAMUSCULAR; INTRAVENOUS ONCE
Status: COMPLETED | OUTPATIENT
Start: 2024-11-22 | End: 2024-11-22

## 2024-11-22 ASSESSMENT — PAIN SCALES - GENERAL: PAINLEVEL_OUTOF10: 0-NO PAIN

## 2024-11-22 NOTE — PROGRESS NOTES
"FUV    Last visit - 24     HISTORY OF PRESENT ILLNESS:   Rajat Donovan is a 64 y.o. male who is being seen today for prostate biopsy  -elevated PSA with PI-RADS 4 on MRI  -History of HDL, Constipation, ADD  -ED taking Sildenafil 100 mg.   -Family history Prostate Cancer: Uncle, . Unsure if he passed due to Prostate Cancer.   PAST MEDICAL HISTORY:  No past medical history on file.    PAST SURGICAL HISTORY:  No past surgical history on file.     ALLERGIES:   No Known Allergies     MEDICATIONS:   Current Outpatient Medications   Medication Instructions    amphetamine-dextroamphetamine (Adderall) 20 mg tablet 20 mg, oral, Daily    colchicine 0.6 mg, oral, 2 times daily    sildenafil (VIAGRA) 100 mg        PHYSICAL EXAM:  Blood pressure (!) 162/92, temperature 37 °C (98.6 °F).  Constitutional: Patient appears well-developed and well-nourished. No distress.    Pulmonary/Chest: Effort normal. No respiratory distress.   Abdominal: Soft, ND NT  : WNL  Musculoskeletal: Normal range of motion.    Neurological: Alert and oriented to person, place, and time.  Psychiatric: Normal mood and affect. Behavior is normal. Thought content normal.      Labs:  No results found for: \"TESTOSTERONE\"  Lab Results   Component Value Date    PSA 5.02 (H) 2024     PSA 3.45 22          No components found for: \"CBC\"  Lab Results   Component Value Date    CREATININE 0.98 2024     No components found for: \"TESTOTMS\"  No results found for: \"TESTF\"    Imaging: Prostate MRI from 2024   IMPRESSION:  A PI-RADS 4 lesion in the right posteromedial peripheral zone of the midgland. No definite signs of gross extracapsular extension. No evidence of enlarged pelvic lymph nodes.    Discussion: Fusion Biopsy with TRUS of ROIs with standard biopsy and 3 targeted biopsies were completed today without complications and he tolerated the procedure well. Transrectal diagnostic ultrasound guidance for needle biopsy of the prostate " was used. Specimen obtained from SIDNEY's, Right Base, Right Mid, Right [place], Left Base, Left Mid and Left [place]. Post-biopsy care instructions were provided to patient. Will follow up in 2 weeks to discuss biopsy results.      Assessment:      1. Elevated PSA  Surgical Pathology Exam    Biopsy prostate      2. Prophylactic antibiotic  cefTRIAXone (Rocephin) vial 1 g          PROSTATE BIOPSY     Plan:    Follow up 2 weeks after biopsy to discuss results.   All questions and concerns were addressed. Patient verbalizes understanding and has no other questions at this time.     Scribe Attestation  By signing my name below, IAnne Scribe   attest that this documentation has been prepared under the direction and in the presence of Juan Yao MD.

## 2024-12-04 LAB
LABORATORY COMMENT REPORT: NORMAL
PATH REPORT.FINAL DX SPEC: NORMAL
PATH REPORT.GROSS SPEC: NORMAL
PATH REPORT.RELEVANT HX SPEC: NORMAL
PATH REPORT.TOTAL CANCER: NORMAL

## 2024-12-05 NOTE — PROGRESS NOTES
"Virtual or Telephone Consent    An interactive audio and video telecommunication system which permits real time communications between the patient (at the originating site) and provider (at the distant site) was utilized to provide this telehealth service.   Verbal consent was requested and obtained from Rajat Donovan on this date, 24 for a telehealth visit.      Last visit - 24     HISTORY OF PRESENT ILLNESS:   Rajat Donovan is a 64 y.o. male who is being seen today for prostate biopsy results  -elevated PSA with PI-RADS 4 on MRI  -History of HDL, Constipation, ADD  -ED taking Sildenafil 100 mg.   -Family history Prostate Cancer: Uncle, . Unsure if he passed due to Prostate Cancer.     PAST MEDICAL HISTORY:  No past medical history on file.    PAST SURGICAL HISTORY:  No past surgical history on file.     ALLERGIES:   No Known Allergies     MEDICATIONS:   Current Outpatient Medications   Medication Instructions    amphetamine-dextroamphetamine (Adderall) 20 mg tablet 20 mg, oral, Daily    colchicine 0.6 mg, oral, 2 times daily    sildenafil (VIAGRA) 100 mg        PHYSICAL EXAM:  There were no vitals taken for this visit.  Constitutional: Patient appears well-developed and well-nourished. No distress.    Pulmonary/Chest: Effort normal. No respiratory distress.   Abdominal: Soft, ND NT  : WNL  Musculoskeletal: Normal range of motion.    Neurological: Alert and oriented to person, place, and time.  Psychiatric: Normal mood and affect. Behavior is normal. Thought content normal.      Labs:  No results found for: \"TESTOSTERONE\"  Lab Results   Component Value Date    PSA 5.02 (H) 2024     PSA 3.45 22          No components found for: \"CBC\"  Lab Results   Component Value Date    CREATININE 0.98 2024     No components found for: \"TESTOTMS\"  No results found for: \"TESTF\"    Imaging: Prostate MRI from 2024   IMPRESSION:  A PI-RADS 4 lesion in the right posteromedial peripheral " zone of the midgland. No definite signs of gross extracapsular extension. No evidence of enlarged pelvic lymph nodes.    Surgical pathology 11/22/24: Prostatic adenocarcinoma, Lincoln score 3+3=6, Grade Group 1 involving 1 of 4 fragments and cores discontinuously and approximately 30% of the overall specimen     Discussion: Patient has GGG1 (3+3) prostate cancer. Results reviewed with patient. Patient reassured. I had a long discussion with him and his family regarding the different treatment options of radiation and surgery. We also discussed the surgical option of robotic radical prostatectomy with bilateral pelvic lymph node dissection. We reviewed the role of active surveillance in detail. I discussed all the different risks, benefits, and alternatives of each one of these in detail, and I answered their questions to their satisfaction. I also gave him some literature to review. I have offered an appointment for him to meet with one of our Radiation Oncologists to discuss the radiation options in more detail and I will be available at any time for answer any further questions. We discussed this is low grade prostate cancer and he may continue with surveillance. I will see him back after he has had a chance to review these options in more detail to help answer any questions along the way. We will wait for him to decide on how he would like to be treated. He was offered decipher testing and will repeat PSA in 3 months for surveillance. Pt is agreeable to this plan.       Assessment:      1. Malignant neoplasm of prostate (Multi)  PSA        FUV     Plan:   Follow-up in 3 months with PSA prior.   Proceed with decipher testing  All questions and concerns were addressed. Patient verbalizes understanding and has no other questions at this time.     Scribe Attestation  By signing my name below, IClinton Scribe   attest that this documentation has been prepared under the direction and in the presence of Juan HOLLY  MD Maximilian.

## 2024-12-06 ENCOUNTER — TELEMEDICINE (OUTPATIENT)
Dept: UROLOGY | Facility: HOSPITAL | Age: 64
End: 2024-12-06
Payer: COMMERCIAL

## 2024-12-06 DIAGNOSIS — C61 MALIGNANT NEOPLASM OF PROSTATE (MULTI): ICD-10-CM

## 2024-12-06 PROCEDURE — 99214 OFFICE O/P EST MOD 30 MIN: CPT | Performed by: UROLOGY

## 2024-12-06 PROCEDURE — G2211 COMPLEX E/M VISIT ADD ON: HCPCS | Performed by: UROLOGY

## 2024-12-11 ENCOUNTER — TELEPHONE (OUTPATIENT)
Dept: UROLOGY | Facility: HOSPITAL | Age: 64
End: 2024-12-11
Payer: COMMERCIAL

## 2025-01-15 DIAGNOSIS — F98.8 ATTENTION DEFICIT DISORDER (ADD) WITHOUT HYPERACTIVITY: ICD-10-CM

## 2025-01-15 RX ORDER — DEXTROAMPHETAMINE SACCHARATE, AMPHETAMINE ASPARTATE, DEXTROAMPHETAMINE SULFATE AND AMPHETAMINE SULFATE 5; 5; 5; 5 MG/1; MG/1; MG/1; MG/1
20 TABLET ORAL DAILY
Qty: 30 TABLET | Refills: 0 | Status: SHIPPED | OUTPATIENT
Start: 2025-01-15

## 2025-02-20 DIAGNOSIS — F98.8 ATTENTION DEFICIT DISORDER (ADD) WITHOUT HYPERACTIVITY: ICD-10-CM

## 2025-02-20 RX ORDER — DEXTROAMPHETAMINE SACCHARATE, AMPHETAMINE ASPARTATE, DEXTROAMPHETAMINE SULFATE AND AMPHETAMINE SULFATE 5; 5; 5; 5 MG/1; MG/1; MG/1; MG/1
20 TABLET ORAL DAILY
Qty: 30 TABLET | Refills: 0 | Status: SHIPPED | OUTPATIENT
Start: 2025-02-20

## 2025-03-15 LAB — PSA SERPL-MCNC: 4.27 NG/ML

## 2025-03-20 NOTE — PROGRESS NOTES
"Virtual or Telephone Consent    An interactive audio and video telecommunication system which permits real time communications between the patient (at the originating site) and provider (at the distant site) was utilized to provide this telehealth service.   Verbal consent was requested and obtained from Rajat Donovan on this date, 25 for a telehealth visit.      Last visit - 24     HISTORY OF PRESENT ILLNESS:   Rajat Donovan is a 64 y.o. male who is being seen today for PSA and decipher results  -elevated PSA with PI-RADS 4 on MRI 24  -Surgical pathology 24: Prostatic adenocarcinoma, Steele score 3+3=6, Grade Group 1 involving 1 of 4 fragments and cores discontinuously and approximately 30% of the overall specimen   -History of HDL, Constipation, ADD  -ED taking Sildenafil 100 mg.   -Family history Prostate Cancer: Uncle, . Unsure if he passed due to Prostate Cancer.     PAST MEDICAL HISTORY:  No past medical history on file.    PAST SURGICAL HISTORY:  No past surgical history on file.     ALLERGIES:   No Known Allergies     MEDICATIONS:   Current Outpatient Medications   Medication Instructions    amphetamine-dextroamphetamine (Adderall) 20 mg tablet 20 mg, oral, Daily    colchicine 0.6 mg, oral, 2 times daily    sildenafil (VIAGRA) 100 mg        PHYSICAL EXAM:  There were no vitals taken for this visit.  Constitutional: Patient appears well-developed and well-nourished. No distress.    Pulmonary/Chest: Effort normal. No respiratory distress.   Abdominal: Soft, ND NT  : WNL  Musculoskeletal: Normal range of motion.    Neurological: Alert and oriented to person, place, and time.  Psychiatric: Normal mood and affect. Behavior is normal. Thought content normal.      Labs:  No results found for: \"TESTOSTERONE\"  Lab Results   Component Value Date    PSA 4.27 (H) 2025     Lab Results   Component Value Date    PSA 4.27 (H) 2025    PSA 5.02 (H) 2024    PSA 3.45 " "11/29/2022       No components found for: \"CBC\"  Lab Results   Component Value Date    CREATININE 0.98 07/31/2024     No components found for: \"TESTOTMS\"  No results found for: \"TESTF\"    Imaging: Prostate MRI from 9/7/2024   IMPRESSION:  A PI-RADS 4 lesion in the right posteromedial peripheral zone of the midgland. No definite signs of gross extracapsular extension. No evidence of enlarged pelvic lymph nodes.    Surgical pathology 11/22/24: Prostatic adenocarcinoma, Argyle score 3+3=6, Grade Group 1 involving 1 of 4 fragments and cores discontinuously and approximately 30% of the overall specimen     Discussion: Patient has GG1 (3+3) prostate cancer. Results reviewed with patient. Patient reassured that safe to monitor his PSA as it is coming down. Will follow up in 6 months with PSA prior.     Assessment:      No diagnosis found.    FUV     Plan:   Follow-up in 6 months with PSA prior.   All questions and concerns were addressed. Patient verbalizes understanding and has no other questions at this time.     Scribe Attestation  By signing my name below, IRadhika , Julia   attest that this documentation has been prepared under the direction and in the presence of Juan Yao MD.  "

## 2025-03-21 ENCOUNTER — TELEMEDICINE (OUTPATIENT)
Dept: UROLOGY | Facility: HOSPITAL | Age: 65
End: 2025-03-21
Payer: COMMERCIAL

## 2025-03-21 DIAGNOSIS — C61 MALIGNANT NEOPLASM OF PROSTATE (MULTI): ICD-10-CM

## 2025-03-21 PROCEDURE — 99214 OFFICE O/P EST MOD 30 MIN: CPT | Performed by: UROLOGY

## 2025-03-21 PROCEDURE — G2211 COMPLEX E/M VISIT ADD ON: HCPCS | Performed by: UROLOGY

## 2025-03-31 ENCOUNTER — TELEPHONE (OUTPATIENT)
Dept: UROLOGY | Facility: HOSPITAL | Age: 65
End: 2025-03-31
Payer: COMMERCIAL

## 2025-04-25 DIAGNOSIS — F98.8 ATTENTION DEFICIT DISORDER (ADD) WITHOUT HYPERACTIVITY: ICD-10-CM

## 2025-04-28 RX ORDER — DEXTROAMPHETAMINE SACCHARATE, AMPHETAMINE ASPARTATE, DEXTROAMPHETAMINE SULFATE AND AMPHETAMINE SULFATE 5; 5; 5; 5 MG/1; MG/1; MG/1; MG/1
20 TABLET ORAL DAILY
Qty: 30 TABLET | Refills: 0 | Status: SHIPPED | OUTPATIENT
Start: 2025-04-28

## 2025-05-19 LAB — SCAN RESULT: NORMAL
